# Patient Record
Sex: FEMALE | NOT HISPANIC OR LATINO | Employment: FULL TIME | URBAN - METROPOLITAN AREA
[De-identification: names, ages, dates, MRNs, and addresses within clinical notes are randomized per-mention and may not be internally consistent; named-entity substitution may affect disease eponyms.]

---

## 2025-05-07 ENCOUNTER — HOSPITAL ENCOUNTER (EMERGENCY)
Facility: HOSPITAL | Age: 30
Discharge: HOME | End: 2025-05-07
Attending: EMERGENCY MEDICINE

## 2025-05-07 ENCOUNTER — CLINICAL SUPPORT (OUTPATIENT)
Dept: EMERGENCY MEDICINE | Facility: HOSPITAL | Age: 30
End: 2025-05-07

## 2025-05-07 VITALS
HEART RATE: 105 BPM | TEMPERATURE: 98.1 F | RESPIRATION RATE: 16 BRPM | HEIGHT: 63 IN | SYSTOLIC BLOOD PRESSURE: 99 MMHG | OXYGEN SATURATION: 99 % | BODY MASS INDEX: 29.59 KG/M2 | WEIGHT: 167 LBS | DIASTOLIC BLOOD PRESSURE: 58 MMHG

## 2025-05-07 DIAGNOSIS — R11.0 NAUSEA: ICD-10-CM

## 2025-05-07 DIAGNOSIS — J06.9 VIRAL URI WITH COUGH: Primary | ICD-10-CM

## 2025-05-07 LAB
APPEARANCE UR: ABNORMAL
ATRIAL RATE: 118 BPM
BILIRUB UR STRIP.AUTO-MCNC: NEGATIVE MG/DL
COLOR UR: YELLOW
FLUAV RNA RESP QL NAA+PROBE: NOT DETECTED
FLUBV RNA RESP QL NAA+PROBE: NOT DETECTED
GLUCOSE UR STRIP.AUTO-MCNC: NORMAL MG/DL
KETONES UR STRIP.AUTO-MCNC: ABNORMAL MG/DL
LEUKOCYTE ESTERASE UR QL STRIP.AUTO: NEGATIVE
MUCOUS THREADS #/AREA URNS AUTO: NORMAL /LPF
NITRITE UR QL STRIP.AUTO: NEGATIVE
P AXIS: 63 DEGREES
P OFFSET: 183 MS
P ONSET: 120 MS
PH UR STRIP.AUTO: 5.5 [PH]
PR INTERVAL: 202 MS
PREGNANCY TEST URINE, POC: NEGATIVE
PROT UR STRIP.AUTO-MCNC: ABNORMAL MG/DL
Q ONSET: 221 MS
QRS COUNT: 19 BEATS
QRS DURATION: 74 MS
QT INTERVAL: 298 MS
QTC CALCULATION(BAZETT): 417 MS
QTC FREDERICIA: 373 MS
R AXIS: 64 DEGREES
RBC # UR STRIP.AUTO: ABNORMAL MG/DL
RBC #/AREA URNS AUTO: NORMAL /HPF
SARS-COV-2 RNA RESP QL NAA+PROBE: NOT DETECTED
SP GR UR STRIP.AUTO: 1.03
SQUAMOUS #/AREA URNS AUTO: NORMAL /HPF
T AXIS: 36 DEGREES
T OFFSET: 370 MS
UROBILINOGEN UR STRIP.AUTO-MCNC: NORMAL MG/DL
VENTRICULAR RATE: 118 BPM
WBC #/AREA URNS AUTO: NORMAL /HPF

## 2025-05-07 PROCEDURE — 2500000001 HC RX 250 WO HCPCS SELF ADMINISTERED DRUGS (ALT 637 FOR MEDICARE OP)

## 2025-05-07 PROCEDURE — 81025 URINE PREGNANCY TEST: CPT

## 2025-05-07 PROCEDURE — 81001 URINALYSIS AUTO W/SCOPE: CPT | Performed by: EMERGENCY MEDICINE

## 2025-05-07 PROCEDURE — 99284 EMERGENCY DEPT VISIT MOD MDM: CPT | Mod: 25 | Performed by: EMERGENCY MEDICINE

## 2025-05-07 PROCEDURE — 93005 ELECTROCARDIOGRAM TRACING: CPT

## 2025-05-07 PROCEDURE — 93010 ELECTROCARDIOGRAM REPORT: CPT

## 2025-05-07 PROCEDURE — 2500000004 HC RX 250 GENERAL PHARMACY W/ HCPCS (ALT 636 FOR OP/ED): Mod: JZ

## 2025-05-07 PROCEDURE — 96374 THER/PROPH/DIAG INJ IV PUSH: CPT

## 2025-05-07 PROCEDURE — 96361 HYDRATE IV INFUSION ADD-ON: CPT

## 2025-05-07 PROCEDURE — 99284 EMERGENCY DEPT VISIT MOD MDM: CPT

## 2025-05-07 PROCEDURE — 87636 SARSCOV2 & INF A&B AMP PRB: CPT

## 2025-05-07 RX ORDER — ONDANSETRON 4 MG/1
4 TABLET, FILM COATED ORAL EVERY 6 HOURS
Qty: 12 TABLET | Refills: 0 | Status: SHIPPED | OUTPATIENT
Start: 2025-05-07 | End: 2025-05-10

## 2025-05-07 RX ORDER — KETOROLAC TROMETHAMINE 15 MG/ML
15 INJECTION, SOLUTION INTRAMUSCULAR; INTRAVENOUS ONCE
Status: COMPLETED | OUTPATIENT
Start: 2025-05-07 | End: 2025-05-07

## 2025-05-07 RX ORDER — IBUPROFEN 600 MG/1
600 TABLET ORAL EVERY 6 HOURS PRN
Qty: 28 TABLET | Refills: 0 | Status: SHIPPED | OUTPATIENT
Start: 2025-05-07 | End: 2025-05-14

## 2025-05-07 RX ORDER — ONDANSETRON HYDROCHLORIDE 2 MG/ML
4 INJECTION, SOLUTION INTRAVENOUS ONCE
Status: COMPLETED | OUTPATIENT
Start: 2025-05-07 | End: 2025-05-07

## 2025-05-07 RX ORDER — ACETAMINOPHEN 325 MG/1
650 TABLET ORAL EVERY 6 HOURS PRN
Qty: 30 TABLET | Refills: 0 | Status: SHIPPED | OUTPATIENT
Start: 2025-05-07 | End: 2025-05-17

## 2025-05-07 RX ORDER — ACETAMINOPHEN 325 MG/1
975 TABLET ORAL ONCE
Status: COMPLETED | OUTPATIENT
Start: 2025-05-07 | End: 2025-05-07

## 2025-05-07 RX ADMIN — SODIUM CHLORIDE, SODIUM LACTATE, POTASSIUM CHLORIDE, AND CALCIUM CHLORIDE 1000 ML: .6; .31; .03; .02 INJECTION, SOLUTION INTRAVENOUS at 18:54

## 2025-05-07 RX ADMIN — ONDANSETRON 4 MG: 2 INJECTION INTRAMUSCULAR; INTRAVENOUS at 16:55

## 2025-05-07 RX ADMIN — ACETAMINOPHEN 975 MG: 325 TABLET, FILM COATED ORAL at 16:55

## 2025-05-07 RX ADMIN — KETOROLAC TROMETHAMINE 15 MG: 15 INJECTION, SOLUTION INTRAMUSCULAR; INTRAVENOUS at 18:54

## 2025-05-07 RX ADMIN — SODIUM CHLORIDE, SODIUM LACTATE, POTASSIUM CHLORIDE, AND CALCIUM CHLORIDE 1000 ML: .6; .31; .03; .02 INJECTION, SOLUTION INTRAVENOUS at 16:55

## 2025-05-07 ASSESSMENT — PAIN DESCRIPTION - DESCRIPTORS: DESCRIPTORS: ACHING

## 2025-05-07 ASSESSMENT — PAIN SCALES - GENERAL: PAINLEVEL_OUTOF10: 10 - WORST POSSIBLE PAIN

## 2025-05-07 ASSESSMENT — PAIN - FUNCTIONAL ASSESSMENT: PAIN_FUNCTIONAL_ASSESSMENT: 0-10

## 2025-05-07 ASSESSMENT — PAIN DESCRIPTION - LOCATION: LOCATION: OTHER (COMMENT)

## 2025-05-07 ASSESSMENT — COLUMBIA-SUICIDE SEVERITY RATING SCALE - C-SSRS
1. IN THE PAST MONTH, HAVE YOU WISHED YOU WERE DEAD OR WISHED YOU COULD GO TO SLEEP AND NOT WAKE UP?: NO
6. HAVE YOU EVER DONE ANYTHING, STARTED TO DO ANYTHING, OR PREPARED TO DO ANYTHING TO END YOUR LIFE?: NO
2. HAVE YOU ACTUALLY HAD ANY THOUGHTS OF KILLING YOURSELF?: NO

## 2025-05-07 ASSESSMENT — PAIN DESCRIPTION - PAIN TYPE: TYPE: ACUTE PAIN

## 2025-05-07 NOTE — ED PROVIDER NOTES
History of Present Illness       History provided by: Patient  Limitations to History: None    HPI:  HPI   Patient is a 29-year-old female that presents to the ED for flulike symptoms.  Symptom onset was yesterday.  She endorses diffuse bodyaches affecting her chest, back, abdomen. Denies shortness of breath. She has documented fever.  Admits to cough, congestion, sore throat, headache.  States that this is not the worst headache of her life and she does occasionally get them.  No associated vision changes or syncope.  She is feeling weak.  She has not been able to eat anything since yesterday.  She endorses nausea without vomiting.  Denies changes in bowel or bladder habits.  No burning/frequency/hesitancy/hematuria with urination.  Denies chance of pregnancy.  Denies history of abdominal surgeries.      Physical Exam   Physical Exam  Constitutional:       Appearance: Normal appearance. She is not toxic-appearing.   HENT:      Head: Normocephalic and atraumatic.      Nose: Congestion present.      Mouth/Throat:      Mouth: Mucous membranes are dry.      Pharynx: Oropharynx is clear. No oropharyngeal exudate or posterior oropharyngeal erythema.   Eyes:      Extraocular Movements: Extraocular movements intact.      Pupils: Pupils are equal, round, and reactive to light.   Cardiovascular:      Heart sounds: Normal heart sounds. No murmur heard.  Pulmonary:      Effort: Pulmonary effort is normal.      Breath sounds: Normal breath sounds. No stridor. No wheezing.   Abdominal:      General: There is no distension.      Palpations: Abdomen is soft.      Tenderness: There is no abdominal tenderness. There is no guarding or rebound.   Musculoskeletal:      Cervical back: Normal range of motion and neck supple.   Skin:     General: Skin is warm and dry.      Capillary Refill: Capillary refill takes less than 2 seconds.   Neurological:      General: No focal deficit present.      Mental Status: She is alert and oriented  to person, place, and time.      Sensory: No sensory deficit.      Motor: No weakness.   Psychiatric:         Mood and Affect: Mood normal.         Behavior: Behavior normal.                      Medical Decision Making & ED Course     ED Course & MDM       Medical Decision Making:  Medical Decision Making     ----  Patient is a 29-year-old female that presents to the ED for flulike symptoms. History obtained from patient. History and physical exam are as documented above. Fever 39*C and tachycardic 115 bpm. Patient was seen and discussed with Dr. Tidwell. Differential diagnoses considered include but are not limited to: Influenza, COVID, UTI, pregnancy, pancreatitis, appendicitis, cholecystitis, gastritis, PE.    Patient resting comfortably in ED exam chair and does not appear in any acute distress.  Abrupt onset of flulike symptoms since yesterday.  She has no tenderness to palpation throughout her abdomen.  Less concern for acute infectious process such as appendicitis or cholecystitis.  Likely viral etiology.  Patient is tachycardic to 115 bpm and febrile.  She has no history of blood clots, is not taking any blood thinners.  No recent surgeries or travel.  Does have reproducible chest pain diffusely on exam, likely MSK.  Denies associated shortness of breath.  Patient was treated in ED with Zofran, Tylenol, LR fluid bolus.  Upon reevaluation after completion of fluids patient states that she is feeling much better however, she remains febrile and tachycardic.  Heart rate has improved to 108 bpm and temperature to 38.1 °C.  Less concern for PE given lack of risk factors and response to fluids.  Will hold off on imaging at this time.  Another LR bolus ordered along with Toradol.  She is provided with crackers and ginger ale as she feels like she can eat now.  Urine pregnancy test is negative, and viral swabs were negative, urinalysis not suggestive of acute UTI.  Upon reevaluation, she states she is unable to  "tolerate crackers and ginger ale previously provided.  Fever has improved, temperature 98.1 °F.  Heart rate improved to 105 bpm.  Denies chest pain and shortness of breath.  She feels comfortable going home.  She is encouraged to rest and drink plenty of fluids over the next few days.  Prescription sent to her pharmacy to use as directed for symptom control.  She is instructed to follow-up with PCP otherwise given strict return precautions.  Patient is in agreement with this plan and discharged in stable condition.             Visit Vitals  /65   Pulse (!) 115   Temp (!) 39 °C (102.2 °F) (Oral)   Resp 16   Ht 1.6 m (5' 3\")   Wt 75.8 kg (167 lb)   SpO2 97%   BMI 29.58 kg/m²   BSA 1.84 m²        Labs Reviewed - No data to display    No orders to display       ED Course:  ED Course as of 05/07/25 2030   Wed May 07, 2025   1641 EKG sinus tachycardia 118 bpm.  Normal axis.  CT interval 202 ms.  QRS 74 ms.  QTc 417 ms.  No longer contiguous T wave inversions in anterior leads that were cited from 7/14/2021.  No ST segment elevations or depressions concerning for ischemia. [MA]   2027 Patient reported that she felt greatly improved and was comfortable returning home.  No dyspnea, chest pain, abdominal pain, nausea.  Well-appearing with normal respiratory effort and no conversational dyspnea on room air.  Recommended supportive care with Tylenol Motrin and return to Emergency Department for reassessment if new or worsening symptoms specially shortness of breath, chest pain, abdominal pain, persistent nausea or vomiting.  Do not suspect pneumonia or other serious bacterial illness.  I suspect patient symptoms are most likely explained by viral respiratory illness. [MC]      ED Course User Index  [MA] Destiny Prasad PA-C  [MC] Dustin Miller MD         Diagnoses as of 05/07/25 2030   Viral URI with cough   Nausea         Disposition   Discharged in stable condition.          Destiny Prasad PA-C  Emergency Medicine "      Destiny Prasad PA-C  05/07/25 2035

## 2025-05-08 LAB — HOLD SPECIMEN: NORMAL

## 2025-05-08 NOTE — DISCHARGE INSTRUCTIONS
You were seen and evaluated in the ED for flulike symptoms.  You were treated in the ED with fluids, Tylenol, Toradol, Zofran with improvement of your symptoms.  Prescription sent to your pharmacy which you may use as directed for symptom control.  It is encouraged that you rest and increase fluid intake over the next few days.  You may follow-up with PCP, referral placed.  Otherwise you may return to the ED for any new or worsening symptoms including but not limited to chest pain, shortness of breath, weakness, syncope.